# Patient Record
(demographics unavailable — no encounter records)

---

## 2024-10-28 NOTE — DISCUSSION/SUMMARY
[FreeTextEntry1] : Vaccine counseling.   Parent consent for MCV given.   Health counseling reviewed.   RTC w/ signed consent.

## 2024-10-28 NOTE — PHYSICAL EXAM
[General Appearance - Alert] : alert [General Appearance - Well Developed] : interactive [Appearance Of Head] : the head was normocephalic [Evidence Of Head Injury] : threre was no evidence of injury [Sclera] : the sclera and conjunctiva were normal [PERRL With Normal Accommodation] : pupils were equal in size, round, reactive to light, with normal accommodation

## 2024-11-25 NOTE — DISCUSSION/SUMMARY
[FreeTextEntry1] : Vaccine counseling.   Consent provided for MCV, HPV #2 and INFLUENZA.   Health counseling reviewed.   RTC as needed.

## 2024-11-26 NOTE — DISCUSSION/SUMMARY
[FreeTextEntry1] : vaccine consent obtained; signed parental consent on file; discussed importance of vaccination; discussed possible side effects of given vaccine(s): Men A; pt tolerated vaccine(s) well; ice and motrin/ tylenol as needed for tenderness at injection site

## 2024-11-26 NOTE — HISTORY OF PRESENT ILLNESS
[FreeTextEntry6] : here for vaccine update; no current complaints pt has brought in signed parental consent form parent consented for the following vaccines: Men A